# Patient Record
Sex: FEMALE | Race: BLACK OR AFRICAN AMERICAN | Employment: UNEMPLOYED | URBAN - METROPOLITAN AREA
[De-identification: names, ages, dates, MRNs, and addresses within clinical notes are randomized per-mention and may not be internally consistent; named-entity substitution may affect disease eponyms.]

---

## 2017-06-21 ENCOUNTER — DOCUMENTATION ONLY (OUTPATIENT)
Dept: CARDIOLOGY CLINIC | Age: 82
End: 2017-06-21

## 2017-06-21 ENCOUNTER — OFFICE VISIT (OUTPATIENT)
Dept: CARDIOLOGY CLINIC | Age: 82
End: 2017-06-21

## 2017-06-21 VITALS
DIASTOLIC BLOOD PRESSURE: 65 MMHG | WEIGHT: 170 LBS | SYSTOLIC BLOOD PRESSURE: 159 MMHG | HEART RATE: 55 BPM | BODY MASS INDEX: 30.12 KG/M2 | HEIGHT: 63 IN

## 2017-06-21 DIAGNOSIS — I25.119 ATHEROSCLEROSIS OF NATIVE CORONARY ARTERY OF NATIVE HEART WITH ANGINA PECTORIS (HCC): Primary | ICD-10-CM

## 2017-06-21 DIAGNOSIS — I10 ESSENTIAL HYPERTENSION, BENIGN: ICD-10-CM

## 2017-06-21 DIAGNOSIS — R07.9 CHEST PAIN, UNSPECIFIED: ICD-10-CM

## 2017-06-21 DIAGNOSIS — E78.5 OTHER AND UNSPECIFIED HYPERLIPIDEMIA: ICD-10-CM

## 2017-06-21 DIAGNOSIS — Z95.5 S/P CORONARY ARTERY STENT PLACEMENT: ICD-10-CM

## 2017-06-21 DIAGNOSIS — I35.0 NONRHEUMATIC AORTIC VALVE STENOSIS: ICD-10-CM

## 2017-06-21 RX ORDER — GLUCOSAMINE SULFATE 1500 MG
POWDER IN PACKET (EA) ORAL DAILY
COMMUNITY

## 2017-06-21 RX ORDER — NITROGLYCERIN 0.4 MG/1
TABLET SUBLINGUAL
Qty: 25 TAB | Refills: 1 | Status: SHIPPED | OUTPATIENT
Start: 2017-06-21

## 2017-06-21 RX ORDER — METOPROLOL TARTRATE 25 MG/1
25 TABLET, FILM COATED ORAL 2 TIMES DAILY
Qty: 60 TAB | Refills: 6 | Status: SHIPPED | OUTPATIENT
Start: 2017-06-21

## 2017-06-21 NOTE — MR AVS SNAPSHOT
Visit Information Date & Time Provider Department Dept. Phone Encounter #  
 6/21/2017 11:45 AM Ana Maria Donis MD [facility] [address] [place] [address] [id number] Follow-up Instructions Return for F/u after tests. Your Appointments 6/28/2017  9:30 AM  
PROCEDURE with CA NUC [facility] [place] ([address]) Appt Note: porsha sarmiento [address], [address] [place] [address]  
[address], [address] [place] [address] [place] 7/12/2017  8:30 AM  
PROCEDURE with CA ECHO [facility] [place] ([address]) Appt Note: echo sarmiento [address], [address] [place] [address]  
[address], [address] [place] [address] [place] 7/14/2017 10:30 AM  
Office Visit with Ana Maria Donis MD  
[facility]) Appt Note: post nuc and echo [address], [address] [place] [address]  
[address], [address] [place] [address] [place] Upcoming Health Maintenance Date Due HEMOGLOBIN A1C Q6M 1935 FOOT EXAM Q1 1/23/1945 MICROALBUMIN Q1 1/23/1945 EYE EXAM RETINAL OR DILATED Q1 1/23/1946 DTaP/Tdap/Td series (1 - Tdap) 1/23/1957 ZOSTER VACCINE AGE 60> 1/23/1996 GLAUCOMA SCREENING Q2Y 1/23/2001 OSTEOPOROSIS SCREENING (DEXA) 1/23/2001 MEDICARE YEARLY EXAM 1/23/2001 LIPID PANEL Q1 5/11/2011 Pneumococcal 65+ Low/Medium Risk (2 of 2 - PCV13) 12/4/2014 INFLUENZA AGE 9 TO ADULT 8/1/2017 Allergies as of 6/21/2017  Review Complete On: 6/21/2017 By: Ana Maria Donis MD  
 No Known Allergies Current Immunizations  Never Reviewed Name Date Influenza Vaccine PF 12/4/2013 11:20 AM  
 Pneumococcal Polysaccharide (PPSV-23) 12/4/2013 12:10 PM  
  
 Not reviewed this visit You Were Diagnosed With   
  
 Codes Comments Atherosclerosis of native coronary artery of native heart with angina pectoris ([facility] [address]    -  Primary ICD-10-CM: I25.119 ICD-9-CM: 414.01, 413.9 recent increase angina 
ccha class3 Essential hypertension, benign     ICD-10-CM: I10 
ICD-9-CM: 401.1 mildly elevated S/P coronary artery stent placement     ICD-10-CM: Z95.5 ICD-9-CM: K69.78 4299 
rca 
complicated by proximal dissextion Chest pain, unspecified     ICD-10-CM: R07.9 ICD-9-CM: 786.50 anginal  
 Other and unspecified hyperlipidemia     ICD-10-CM: E78.5 ICD-9-CM: 272.4 stable Nonrheumatic aortic valve stenosis     ICD-10-CM: I35.0 ICD-9-CM: 424.1 ? progression Vitals BP Pulse Height(growth percentile) Weight(growth percentile) BMI Smoking Status 159/65 (!) 55 5' 3\" (1.6 m) 170 lb (77.1 kg) 30.11 kg/m2 Current Every Day Smoker Vitals History BMI and BSA Data Body Mass Index Body Surface Area  
 30.11 kg/m 2 1.85 m 2 Preferred Pharmacy Pharmacy Name Phone 919 [address], [address] [place] [address] [phone] Your Updated Medication List  
  
   
This list is accurate as of: 6/21/17 12:30 PM.  Always use your most recent med list. amLODIPine 10 mg tablet Commonly known as:  Linda Salvador Take 10 mg by mouth daily. Indications: HYPERTENSION  
  
 aspirin delayed-release 162 mg tablet Take 1 Tab by mouth daily. diclofenac EC 75 mg EC tablet Commonly known as:  VOLTAREN Take 75 mg by mouth two (2) times a day. hydroCHLOROthiazide 25 mg tablet Commonly known as:  HYDRODIURIL Take 25 mg by mouth daily. isosorbide dinitrate 20 mg tablet Commonly known as:  ISORDIL Take 20 mg by mouth two (2) times a day. KLOR-CON 10 10 mEq tablet Generic drug:  potassium chloride SR Take 10 mEq by mouth daily. metoprolol tartrate 25 mg tablet Commonly known as:  LOPRESSOR Take 1 Tab by mouth two (2) times a day. nitroglycerin 0.4 mg SL tablet Commonly known as:  NITROSTAT PLACE 1 TABLET UNDER THE TONGUE  EVERY FIVE  MINUTES AS NEEDED FOR CHEST PAIN.  
  
 raNITIdine 150 mg tablet Commonly known as:  ZANTAC Take 150 mg by mouth two (2) times a day. simvastatin 40 mg tablet Commonly known as:  ZOCOR Take 40 mg by mouth nightly. VITAMIN D3 1,000 unit Cap Generic drug:  cholecalciferol Take  by mouth daily. Prescriptions Sent to Pharmacy Refills  
 nitroglycerin (NITROSTAT) 0.4 mg SL tablet 1 Sig: PLACE 1 TABLET UNDER THE TONGUE  EVERY FIVE  MINUTES AS NEEDED FOR CHEST PAIN. Class: Normal  
 Pharmacy: [address], [facility] [address] Ph #: [phone]  
 metoprolol tartrate (LOPRESSOR) 25 mg tablet 6 Sig: Take 1 Tab by mouth two (2) times a day. Class: Normal  
 Pharmacy: [facility] [address], [facility] [address] Ph #: [phone] Route: Oral  
  
We Performed the Following AMB POC EKG ROUTINE W/ 12 LEADS, INTER & REP [26925 CPT(R)] Follow-up Instructions Return for F/u after tests. To-Do List   
 06/24/2017 Cardiac Services:  2D ECHO COMPLETE ADULT (TTE)   
  
 06/28/2017 Nursing:  SCHEDULE NUCLEAR STUDY Introducing [facility]! [employer] introduces [employer] patient portal. Now you can access parts of your medical record, email your doctor's office, and request medication refills online. 1. In your internet browser, go to [email or web address] 2. Click on the First Time User? Click Here link in the Sign In box. You will see the New Member Sign Up page. 3. Enter your giddy Access Code exactly as it appears below. You will not need to use this code after youve completed the sign-up process. If you do not sign up before the expiration date, you must request a new code. · giddy Access Code: [email or web address] Expires: 9/19/2017 12:03 PM 
 4. Enter the last four digits of your Social Security Number (xxxx) and Date of Birth (mm/dd/yyyy) as indicated and click Submit. You will be taken to the next sign-up page. 5. Create a [employer] ID. This will be your [employer] login ID and cannot be changed, so think of one that is secure and easy to remember. 6. Create a [employer] password. You can change your password at any time. 7. Enter your Password Reset Question and Answer. This can be used at a later time if you forget your password. 8. Enter your e-mail address. You will receive e-mail notification when new information is available in [address] 9. Click Sign Up. You can now view and download portions of your medical record. 10. Click the Download Summary menu link to download a portable copy of your medical information. If you have questions, please visit the Frequently Asked Questions section of the [employer] website. Remember, [employer] is NOT to be used for urgent needs. For medical emergencies, dial 911. Now available from your iPhone and Android! Please provide this summary of care documentation to your next provider. Your primary care clinician is listed as Barbara De La Rosa. If you have any questions after today's visit, please call [phone].

## 2017-06-21 NOTE — PROGRESS NOTES
HISTORY OF PRESENT ILLNESS  Lowell Gatica is a 81 y.o. female. Valvular Heart Disease   The history is provided by the patient. This is a chronic problem. The problem occurs constantly. The problem has not changed since onset. Associated symptoms include chest pain. Pertinent negatives include no abdominal pain, no headaches and no shortness of breath. Chest Pain (Angina)    The history is provided by the patient. This is a recurrent problem. The current episode started more than 1 week ago. The problem has been gradually worsening. The problem occurs daily. The pain is associated with exertion. The pain is present in the substernal region. The pain is moderate. The quality of the pain is described as pressure-like. The pain does not radiate. Pertinent negatives include no abdominal pain, no claudication, no cough, no dizziness, no fever, no headaches, no hemoptysis, no nausea, no orthopnea, no palpitations, no PND, no shortness of breath, no sputum production, no vomiting and no weakness. Review of Systems   Constitutional: Negative for chills and fever. HENT: Negative for nosebleeds. Eyes: Negative for blurred vision and double vision. Respiratory: Negative for cough, hemoptysis, sputum production, shortness of breath and wheezing. Cardiovascular: Positive for chest pain. Negative for palpitations, orthopnea, claudication, leg swelling and PND. Gastrointestinal: Negative for abdominal pain, heartburn, nausea and vomiting. Musculoskeletal: Negative for myalgias. Skin: Negative for rash. Neurological: Negative for dizziness, weakness and headaches. Endo/Heme/Allergies: Does not bruise/bleed easily.      Family History   Problem Relation Age of Onset    Heart Attack Neg Hx     Heart Surgery Neg Hx        Past Medical History:   Diagnosis Date    Chest pain, unspecified 7/18/2013    possible ngina ,chest wall     Coronary atherosclerosis of native coronary artery 12/18/2013    Essential hypertension     Essential hypertension, benign 7/18/2013    stable     Hypercholesteremia     Hypertension     Other and unspecified angina pectoris 7/24/2013    still with symptoms s/o angina,will maximize meds in view of recent normal nuc scan and see     Other and unspecified angina pectoris 7/24/2013    Other and unspecified hyperlipidemia 7/18/2013    PUD (peptic ulcer disease)     Shortness of breath 7/18/2013    r/o chf,cmp,copd     Type II or unspecified type diabetes mellitus without mention of complication, not stated as uncontrolled 7/18/2013       Past Surgical History:   Procedure Laterality Date    HX CATARACT REMOVAL         Social History   Substance Use Topics    Smoking status: Current Every Day Smoker     Packs/day: 0.50    Smokeless tobacco: Never Used    Alcohol use 2.0 oz/week     4 Cans of beer per week       No Known Allergies    Prior to Admission medications    Medication Sig Start Date End Date Taking? Authorizing Provider   cholecalciferol (VITAMIN D3) 1,000 unit cap Take  by mouth daily. Yes Historical Provider   nitroglycerin (NITROSTAT) 0.4 mg SL tablet PLACE 1 TABLET UNDER THE TONGUE  EVERY FIVE  MINUTES AS NEEDED FOR CHEST PAIN. 6/21/17  Yes Kristan Miguel MD   metoprolol tartrate (LOPRESSOR) 25 mg tablet Take 1 Tab by mouth two (2) times a day. 6/21/17  Yes Kristan Miguel MD   hydrochlorothiazide (HYDRODIURIL) 25 mg tablet Take 25 mg by mouth daily. Yes Kristian Medeiros MD   ranitidine (ZANTAC) 150 mg tablet Take 150 mg by mouth two (2) times a day. Yes Kristian Medeiros MD   amLODIPine (NORVASC) 10 mg tablet Take 10 mg by mouth daily. Indications: HYPERTENSION   Yes Kristian Medeiros MD   diclofenac EC (VOLTAREN) 75 mg EC tablet Take 75 mg by mouth two (2) times a day. Yes Kristian Medeiros MD   simvastatin (ZOCOR) 40 mg tablet Take 40 mg by mouth nightly. Yes Kristian Medeiros MD   isosorbide dinitrate (ISORDIL) 20 mg tablet Take 20 mg by mouth two (2) times a day.    Yes Kristian Medeiros MD   aspirin delayed-release 162 mg tablet Take 1 Tab by mouth daily. 2/13/15   Ilda Carpenter MD   potassium chloride SR (KLOR-CON 10) 10 mEq tablet Take 10 mEq by mouth daily. Phys Other, MD         Visit Vitals    /65    Pulse (!) 55    Ht 5' 3\" (1.6 m)    Wt 77.1 kg (170 lb)    BMI 30.11 kg/m2         Physical Exam   Constitutional: She is oriented to person, place, and time. She appears well-developed and well-nourished. HENT:   Head: Normocephalic and atraumatic. Eyes: Conjunctivae are normal.   Neck: Neck supple. No JVD present. No tracheal deviation present. No thyromegaly present. Cardiovascular: Normal rate and regular rhythm. PMI is not displaced. Exam reveals no gallop, no S3 and no decreased pulses. Murmur heard. Harsh midsystolic murmur is present with a grade of 3/6  at the upper right sternal border radiating to the neck  Pulmonary/Chest: No respiratory distress. She has no wheezes. She has no rales. She exhibits no tenderness. Abdominal: Soft. There is no tenderness. Musculoskeletal: She exhibits no edema. Neurological: She is alert and oriented to person, place, and time. Skin: Skin is warm. Psychiatric: She has a normal mood and affect. Ms. Prudence Harada has a reminder for a \"due or due soon\" health maintenance. I have asked that she contact her primary care provider for follow-up on this health maintenance. CARDIOLOGY STUDIES 7/19/2013   Myocardial Perfusion Scan Result normal   Echocardiogram - Complete Result normal ef,dd,enlarged la   Some recent data might be hidden   2013  PROCEDURES IN HOSPITAL:12/2013: Cardiac catheterization followed by angioplasty and   stent in right posterior descending artery with a drug-eluting stent. This   was complicated by catheter induced dissection at ostium and proximal right   coronary artery which also had dye staining in aortic root around the right   coronary area.  She had stenting of proximal right coronary artery and dye   staining had improved in aortic root. 12/2013  CORONARY INTERVENTION REPORT: Right coronary intervention was performed   with PDA reduced from 90% to 0% with NAN grade 3 flow. Ostial and proximal   dissection was stacked with the use of a 2.7 x 12 mm stent and was reduced   to 0%, NAN grade 3 flow is noted in right coronary artery  SUMMARY:echo-2013  Left ventricle: Systolic function was normal.    Right ventricle: The ventricle was dilated. Left atrium: The atrium was markedly dilated. Mitral valve: There was mild regurgitation. Aortic valve: There was mild stenosis. Valve mean gradient was 17.76 mmHg. Aorta, systemic arteries: There was no evidence for dissection. There were  calcific changes of the aortic root and ascending aorta. Other visualized  portions of the aorta appeared normal.  6/2017-ekg  Sinus  Bradycardia   Low voltage in precordial leads. -RSR(V1) -nondiagnostic. ABNORMAL    Assessment         ICD-10-CM ICD-9-CM    1. Atherosclerosis of native coronary artery of native heart with angina pectoris (HCC) I25.119 414.01 AMB POC EKG ROUTINE W/ 12 LEADS, INTER & REP     413.9 2D ECHO COMPLETE ADULT (TTE)      SCHEDULE NUCLEAR STUDY    recent increase angina  ccha class3   2. Essential hypertension, benign I10 401.1     mildly elevated   3. S/P coronary artery stent placement Z95.5 V45.82     9797  rca  complicated by proximal dissextion   4. Chest pain, unspecified R07.9 786.50     anginal   5. Other and unspecified hyperlipidemia E78.5 272.4     stable   6. Nonrheumatic aortic valve stenosis I35.0 424.1 2D ECHO COMPLETE ADULT (TTE)      SCHEDULE NUCLEAR STUDY    ?  progression       Medications Discontinued During This Encounter   Medication Reason    nitroglycerin (NITROSTAT) 0.4 mg SL tablet Reorder    metoprolol (LOPRESSOR) 50 mg tablet Reorder       Orders Placed This Encounter    SCHEDULE NUCLEAR STUDY     lexiscan stress test     Standing Status:   Future Standing Expiration Date:   6/21/2018    2D ECHO COMPLETE ADULT (TTE)     Standing Status:   Future     Standing Expiration Date:   12/18/2017     Order Specific Question:   Reason for Exam:     Answer:   see diagnosis    AMB POC EKG ROUTINE W/ 12 LEADS, INTER & REP     Order Specific Question:   Reason for Exam:     Answer:   CAD    nitroglycerin (NITROSTAT) 0.4 mg SL tablet     Sig: PLACE 1 TABLET UNDER THE TONGUE  EVERY FIVE  MINUTES AS NEEDED FOR CHEST PAIN. Dispense:  25 Tab     Refill:  1    metoprolol tartrate (LOPRESSOR) 25 mg tablet     Sig: Take 1 Tab by mouth two (2) times a day. Dispense:  60 Tab     Refill:  6       Follow-up Disposition:  Return for F/u after tests.

## 2017-06-21 NOTE — LETTER
Carlota Coker 1935 6/21/2017 Dear Ronn Lewis MD 
 
I had the pleasure of evaluating  Ms. Silvino Coleman in office today. Below are the relevant portions of my assessment and plan of care. ICD-10-CM ICD-9-CM 1. 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  
  413.9 2D ECHO COMPLETE ADULT (TTE) SCHEDULE NUCLEAR STUDY  
 recent increase angina 
ccha class3 2. Essential hypertension, benign I10 401.1   
 mildly elevated 3. S/P coronary artery stent placement Z95.5 V45.82   
 3300 
rca 
complicated by proximal dissextion 4. Chest pain, unspecified R07.9 786.50   
 anginal  
5. Other and unspecified hyperlipidemia E78.5 272.4   
 stable 6. Nonrheumatic aortic valve stenosis I35.0 424.1 2D ECHO COMPLETE ADULT (TTE) SCHEDULE NUCLEAR STUDY ? progression Current Outpatient Prescriptions Medication Sig Dispense Refill  cholecalciferol (VITAMIN D3) 1,000 unit cap Take  by mouth daily.  nitroglycerin (NITROSTAT) 0.4 mg SL tablet PLACE 1 TABLET UNDER THE TONGUE  EVERY FIVE  MINUTES AS NEEDED FOR CHEST PAIN. 25 Tab 1  
 metoprolol tartrate (LOPRESSOR) 25 mg tablet Take 1 Tab by mouth two (2) times a day. 60 Tab 6  
 hydrochlorothiazide (HYDRODIURIL) 25 mg tablet Take 25 mg by mouth daily.  ranitidine (ZANTAC) 150 mg tablet Take 150 mg by mouth two (2) times a day.  amLODIPine (NORVASC) 10 mg tablet Take 10 mg by mouth daily. Indications: HYPERTENSION    
 diclofenac EC (VOLTAREN) 75 mg EC tablet Take 75 mg by mouth two (2) times a day.  simvastatin (ZOCOR) 40 mg tablet Take 40 mg by mouth nightly.  isosorbide dinitrate (ISORDIL) 20 mg tablet Take 20 mg by mouth two (2) times a day.  aspirin delayed-release 162 mg tablet Take 1 Tab by mouth daily. 100 Tab 6  potassium chloride SR (KLOR-CON 10) 10 mEq tablet Take 10 mEq by mouth daily. Orders Placed This Encounter  SCHEDULE NUCLEAR STUDY  
  lexiscan stress test  
  Standing Status:   Future Standing Expiration Date:   6/21/2018  2D ECHO COMPLETE ADULT (TTE) Standing Status:   Future Standing Expiration Date:   12/18/2017 Order Specific Question:   Reason for Exam: Answer:   see diagnosis  AMB POC EKG ROUTINE W/ 12 LEADS, INTER & REP Order Specific Question:   Reason for Exam: Answer:   CAD  cholecalciferol (VITAMIN D3) 1,000 unit cap Sig: Take  by mouth daily.  nitroglycerin (NITROSTAT) 0.4 mg SL tablet Sig: PLACE 1 TABLET UNDER THE TONGUE  EVERY FIVE  MINUTES AS NEEDED FOR CHEST PAIN. Dispense:  25 Tab Refill:  1  
 metoprolol tartrate (LOPRESSOR) 25 mg tablet Sig: Take 1 Tab by mouth two (2) times a day. Dispense:  60 Tab Refill:  6 If you have questions, please do not hesitate to call me. I look forward to following Ms. Meghan Arzate along with you. Sincerely, Car Nolen MD

## 2017-06-28 ENCOUNTER — CLINICAL SUPPORT (OUTPATIENT)
Dept: CARDIOLOGY CLINIC | Age: 82
End: 2017-06-28

## 2017-06-28 DIAGNOSIS — R07.9 CHEST PAIN, UNSPECIFIED: Primary | ICD-10-CM

## 2017-06-28 DIAGNOSIS — Z95.5 S/P CORONARY ARTERY STENT PLACEMENT: ICD-10-CM

## 2017-06-28 DIAGNOSIS — I10 ESSENTIAL HYPERTENSION, BENIGN: ICD-10-CM

## 2017-06-28 DIAGNOSIS — I25.119 ATHEROSCLEROSIS OF NATIVE CORONARY ARTERY OF NATIVE HEART WITH ANGINA PECTORIS (HCC): ICD-10-CM

## 2017-06-28 NOTE — MR AVS SNAPSHOT
Visit Information Date & Time Provider Department Dept. Phone Encounter #  
 6/28/2017  9:30 AM [facility] [address] [place] [address] [id number] Your Appointments 7/12/2017  8:30 AM  
PROCEDURE with CA ECHO [facility] [place] (Margarita Uziel) Appt Note: echo sarmiento [address], [address] [address]  
[address], [address] [facility] [address] 7/14/2017 10:30 AM  
Office Visit with Peace Mathis MD  
[facility]) Appt Note: post nuc and echo [address], [address] [address]  
[address], [address] [facility] [address] Upcoming Health Maintenance Date Due HEMOGLOBIN A1C Q6M 1935 FOOT EXAM Q1 1/23/1945 MICROALBUMIN Q1 1/23/1945 EYE EXAM RETINAL OR DILATED Q1 1/23/1946 DTaP/Tdap/Td series (1 - Tdap) 1/23/1957 ZOSTER VACCINE AGE 60> 1/23/1996 GLAUCOMA SCREENING Q2Y 1/23/2001 OSTEOPOROSIS SCREENING (DEXA) 1/23/2001 MEDICARE YEARLY EXAM 1/23/2001 LIPID PANEL Q1 5/11/2011 Pneumococcal 65+ Low/Medium Risk (2 of 2 - PCV13) 12/4/2014 INFLUENZA AGE 9 TO ADULT 8/1/2017 Allergies as of 6/28/2017  Review Complete On: 6/28/2017 By: Pablito Hoffman LPN No Known Allergies Current Immunizations  Never Reviewed Name Date Influenza Vaccine PF 12/4/2013 11:20 AM  
 Pneumococcal Polysaccharide (PPSV-23) 12/4/2013 12:10 PM  
  
 Not reviewed this visit You Were Diagnosed With   
  
 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 
ICD-9-CM: 401.1 S/P coronary artery stent placement     ICD-10-CM: Z95.5 ICD-9-CM: V45.82 Vitals Smoking Status Current Every Day Smoker Preferred Pharmacy Pharmacy Name Phone 919 [address], [address] [place] [address] ROAD [phone] Your Updated Medication List  
  
   
This list is accurate as of: 6/28/17 12:25 PM.  Always use your most recent med list. amLODIPine 10 mg tablet Commonly known as:  Karyle Grant Take 10 mg by mouth daily. Indications: HYPERTENSION  
  
 aspirin delayed-release 162 mg tablet Take 1 Tab by mouth daily. diclofenac EC 75 mg EC tablet Commonly known as:  VOLTAREN Take 75 mg by mouth two (2) times a day. hydroCHLOROthiazide 25 mg tablet Commonly known as:  HYDRODIURIL Take 25 mg by mouth daily. isosorbide dinitrate 20 mg tablet Commonly known as:  ISORDIL Take 20 mg by mouth two (2) times a day. KLOR-CON 10 10 mEq tablet Generic drug:  potassium chloride SR Take 10 mEq by mouth daily. metoprolol tartrate 25 mg tablet Commonly known as:  LOPRESSOR Take 1 Tab by mouth two (2) times a day. nitroglycerin 0.4 mg SL tablet Commonly known as:  NITROSTAT PLACE 1 TABLET UNDER THE TONGUE  EVERY FIVE  MINUTES AS NEEDED FOR CHEST PAIN.  
  
 raNITIdine 150 mg tablet Commonly known as:  ZANTAC Take 150 mg by mouth two (2) times a day. regadenoson 0.4 mg/5 mL Syrg injection Commonly known as:  LEXISCAN  
5 mL by IntraVENous route once for 1 dose. simvastatin 40 mg tablet Commonly known as:  ZOCOR Take 40 mg by mouth nightly. VITAMIN D3 1,000 unit Cap Generic drug:  cholecalciferol Take  by mouth daily. We Performed the Following CARDIAC STRESS TST,COMPLETE [94188 CPT(R)] CHG MYOCARDIAL SPECT MULTIPLE STUDIES [04852 CPT(R)] MS TC99M TETROFOSMIN [ [facility]] MS TC99M TETROFOSMIN [ [facility]] REGADENOSON 0.1 MG INJECTION [ [facility]] [facility]! [facility] introduces [employer] patient portal. Now you can access parts of your medical record, email your doctor's office, and request medication refills online. 1. In your internet browser, go to [email or web address]. [email or web address] 2. Click on the First Time User? Click Here link in the Sign In box. You will see the New Member Sign Up page. 3. Enter your [employer] Access Code exactly as it appears below. You will not need to use this code after youve completed the sign-up process. If you do not sign up before the expiration date, you must request a new code. · [employer] Access Code: [email or web address] Expires: 9/19/2017 12:03 PM 
 
4. Enter the last four digits of your Social Security Number (xxxx) and Date of Birth (mm/dd/yyyy) as indicated and click Submit. You will be taken to the next sign-up page. 5. Create a [employer] ID. This will be your [employer] login ID and cannot be changed, so think of one that is secure and easy to remember. 6. Create a [employer] password. You can change your password at any time. 7. Enter your Password Reset Question and Answer. This can be used at a later time if you forget your password. 8. Enter your e-mail address. You will receive e-mail notification when new information is available in [address] 9. Click Sign Up. You can now view and download portions of your medical record. 10. Click the Download Summary menu link to download a portable copy of your medical information. If you have questions, please visit the Frequently Asked Questions section of the [employer] website. Remember, [employer] is NOT to be used for urgent needs. For medical emergencies, dial 911. Now available from your iPhone and Android! Please provide this summary of care documentation to your next provider. Your primary care clinician is listed as Renetta Romero. If you have any questions after today's visit, please call [phone].

## 2017-06-28 NOTE — PROGRESS NOTES
[facility]  [address], [facility], [address] [place] [address], [place], [address] [place] [address]  [phone] [facility]  [phone] [place]       Name: Lester Perea         MRN#: [id number]        YOB: 1935   Gender: female Ht:5'3\" Wt:170 lbs       . Date of Rest/Stress Images: 6/28/2017   Referring Physician: Durga Ballesteros MD  Ordering Physician: Daniel Yung. Sadaf Love MD, [facility]  Technologist: Omayra Rodriguez. NOEL Ennis., C.N.M.T  Diagnosis:  1. Chest pain, unspecified    2. Atherosclerosis of native coronary artery of native heart with angina pectoris ([address]    3. Essential hypertension, benign    4. S/P coronary artery stent placement          Rest/Stress Myoview SPECT Myocardial Perfusion Imaging with  Lexiscan Stress and gated SPECT Imaging      PROCEDURE:      Myocardial perfusion imaging was performed at rest approximately 30 mins following the intravenous injection,(Right hand ) of 11.6 mCi of Tc99m Myoview for evaluation of myocardial function and perfusion at rest.    Baseline Data:    Baseline EKG reveals sinus bradycardia, nonspecific ST-T changes. Baseline heart rate is 45. Baseline blood pressure is 134/68. Procedure: The patient was injected with 0.4 mg IV Lexiscan. The patient had no significant symptoms. Heart rate increased from baseline to a heart rate of 79. Blood pressure increased to 152/64. Electrocardiogram showed no significant ST-T changes during the procedure. No significant arrhythmias noted. Diagnosis:   1. Negative EKG portion of Lexiscan stress test.   2. Nuclear imaging report to follow. Pharmacological:  Patient was injected with . 4 mg/mL with Lexiscan intravenously over a period 10 to 20 sec. After pharmacologic stress, the patient was injected intravenously with 38.2 mCi of Tc99m Myoview. Gating post stress tomographic imaging performed approximately 45 minutes post tracer injection.  The data was reconstructed in the short, horizontal long and vertical long axis views and tomographic slices were generated. NUCLEAR IMAGING:    Findings:   1. Stress images reveal normal Myoview distrubution in all the LV segments in short axis, vertical and horizontal long axis views. 2. Resting images have a normal uptake. 3. Gated images reveal normal wall motion and the ejection fraction is calculated to be 85%. Conclusion:   1. Normal perfusion scan. 2. Normal wall motion and ejection fraction. 3. Low risk scan. Thank you for the referral.    E-signed and Interpreting Physician:    Alena Bahena.  Marcelina Lizarraga MD, [facility]     Date of interpretation: 6/28/2017  Date of final report: 6/28/2017

## 2017-07-27 ENCOUNTER — OFFICE VISIT (OUTPATIENT)
Dept: CARDIOLOGY CLINIC | Age: 82
End: 2017-07-27

## 2017-07-27 VITALS
WEIGHT: 170 LBS | HEIGHT: 63 IN | DIASTOLIC BLOOD PRESSURE: 78 MMHG | HEART RATE: 55 BPM | BODY MASS INDEX: 30.12 KG/M2 | SYSTOLIC BLOOD PRESSURE: 185 MMHG

## 2017-07-27 DIAGNOSIS — Z95.5 S/P CORONARY ARTERY STENT PLACEMENT: ICD-10-CM

## 2017-07-27 DIAGNOSIS — I25.119 ATHEROSCLEROSIS OF NATIVE CORONARY ARTERY OF NATIVE HEART WITH ANGINA PECTORIS (HCC): Primary | ICD-10-CM

## 2017-07-27 DIAGNOSIS — I35.0 NONRHEUMATIC AORTIC VALVE STENOSIS: ICD-10-CM

## 2017-07-27 DIAGNOSIS — I10 ESSENTIAL HYPERTENSION, BENIGN: ICD-10-CM

## 2017-07-27 NOTE — MR AVS SNAPSHOT
Visit Information Date & Time Provider Department Dept. Phone Encounter #  
 7/27/2017 11:00 AM Bj Ibarra MD [facility] [address] [facility] [id number] Follow-up Instructions Return in about 3 months (around 10/27/2017). Your Appointments 8/18/2017 10:45 AM  
PROCEDURE with [place] E[facility] [place] ([facility]) Appt Note: echo sarmiento ruby per [facility]; patient no show; echo sarmiento ruby per [facility] pt r/s; mailbox is full 7/20/17 hlr; PAT CALLED TO CANCEL, WILL CALL BACK  [address]; echo sarmiento ruby per [facility] [email or web address], [address] [facility] [address]  
[address], [address] [address]  
  
    
 10/19/2017 10:30 AM  
Office Visit with Bj Ibarra MD  
[facility]) Appt Note: 3 m post echo [email or web address], [address] [facility] [address]  
[address], [address] Upcoming Health Maintenance Date Due HEMOGLOBIN A1C Q6M 1935 FOOT EXAM Q1 1/23/1946 MICROALBUMIN Q1 1/23/1946 EYE EXAM RETINAL OR DILATED Q1 1/23/1946 DTaP/Tdap/Td series (1 - Tdap) 1/23/1957 ZOSTER VACCINE AGE 60> 11/23/1995 GLAUCOMA SCREENING Q2Y 1/23/2001 OSTEOPOROSIS SCREENING (DEXA) 1/23/2001 MEDICARE YEARLY EXAM 1/23/2001 LIPID PANEL Q1 5/11/2011 Pneumococcal 65+ Low/Medium Risk (2 of 2 - PCV13) 12/4/2014 INFLUENZA AGE 9 TO ADULT 8/1/2017 Allergies as of 7/27/2017  Review Complete On: 7/27/2017 By: Bj Ibarra MD  
 No Known Allergies Current Immunizations  Never Reviewed Name Date Influenza Vaccine PF 12/4/2013 11:20 AM  
 Pneumococcal Polysaccharide (PPSV-23) 12/4/2013 12:10 PM  
  
 Not reviewed this visit You Were Diagnosed With   
  
 Codes Comments  Atherosclerosis of native coronary artery of native heart with angina pectoris ([facility] [address]    -  Primary ICD-10-CM: I25.119 ICD-9-CM: 414.01, 413.9 stable 
negative stress test 
manage medically Nonrheumatic aortic valve stenosis     ICD-10-CM: I35.0 ICD-9-CM: 424.1 check echo Essential hypertension, benign     ICD-10-CM: I10 
ICD-9-CM: 401.1 elevated 
labile 
monitor S/P coronary artery stent placement     ICD-10-CM: Z95.5 ICD-9-CM: V45.82 Vitals BP Pulse Height(growth percentile) Weight(growth percentile) BMI Smoking Status 185/78 (!) 55 5' 3\" (1.6 m) 170 lb (77.1 kg) 30.11 kg/m2 Current Every Day Smoker Vitals History BMI and BSA Data Body Mass Index Body Surface Area  
 30.11 kg/m 2 1.85 m 2 Preferred Pharmacy Pharmacy Name Phone 9[address], [address] [place] [address] [phone] Your Updated Medication List  
  
   
This list is accurate as of: 7/27/17 11:44 AM.  Always use your most recent med list. amLODIPine 10 mg tablet Commonly known as:  Kimberli Matar Take 10 mg by mouth daily. Indications: HYPERTENSION  
  
 aspirin delayed-release 162 mg tablet Take 1 Tab by mouth daily. diclofenac EC 75 mg EC tablet Commonly known as:  VOLTAREN Take 75 mg by mouth two (2) times a day. hydroCHLOROthiazide 25 mg tablet Commonly known as:  HYDRODIURIL Take 25 mg by mouth daily. isosorbide dinitrate 20 mg tablet Commonly known as:  ISORDIL Take 20 mg by mouth two (2) times a day. metoprolol tartrate 25 mg tablet Commonly known as:  LOPRESSOR Take 1 Tab by mouth two (2) times a day. nitroglycerin 0.4 mg SL tablet Commonly known as:  NITROSTAT PLACE 1 TABLET UNDER THE TONGUE  EVERY FIVE  MINUTES AS NEEDED FOR CHEST PAIN.  
  
 raNITIdine 150 mg tablet Commonly known as:  ZANTAC Take 150 mg by mouth two (2) times a day. simvastatin 40 mg tablet Commonly known as:  ZOCOR Take 40 mg by mouth nightly. VITAMIN D3 1,000 unit Cap Generic drug:  cholecalciferol Take  by mouth daily. Follow-up Instructions Return in about 3 months (around 10/27/2017). To-Do List   
 07/30/2017 Cardiac Services:  2D ECHO COMPLETE ADULT (TTE) Introducing [facility]! [employer] introduces [employer] patient portal. Now you can access parts of your medical record, email your doctor's office, and request medication refills online. 1. In your internet browser, go to [email or web address]://[email or web address]. [employer] 2. Click on the First Time User? Click Here link in the Sign In box. You will see the New Member Sign Up page. 3. Enter your [employer] Access Code exactly as it appears below. You will not need to use this code after youve completed the sign-up process. If you do not sign up before the expiration date, you must request a new code. · [employer] Access Code: [email or web address] Expires: 9/19/2017 12:03 PM 
 
4. Enter the last four digits of your Social Security Number (xxxx) and Date of Birth (mm/dd/yyyy) as indicated and click Submit. You will be taken to the next sign-up page. 5. Create a [employer] ID. This will be your [employer] login ID and cannot be changed, so think of one that is secure and easy to remember. 6. Create a [employer] password. You can change your password at any time. 7. Enter your Password Reset Question and Answer. This can be used at a later time if you forget your password. 8. Enter your e-mail address. You will receive e-mail notification when new information is available in [address] 9. Click Sign Up. You can now view and download portions of your medical record. 10. Click the Download Summary menu link to download a portable copy of your medical information. If you have questions, please visit the Frequently Asked Questions section of the [employer] website. Remember, [employer] is NOT to be used for urgent needs. For medical emergencies, dial 911. Now available from your iPhone and Android! Please provide this summary of care documentation to your next provider. Your primary care clinician is listed as Manuel Barnes. If you have any questions after today's visit, please call [phone].

## 2017-07-27 NOTE — LETTER
Trevon Annalisadonnie 1935 7/27/2017 Dear Audra Aguilar MD 
 
I had the pleasure of evaluating  Ms. Balwinder Alonzo in office today. Below are the relevant portions of my assessment and plan of care. ICD-10-CM ICD-9-CM 1. Atherosclerosis of native coronary artery of native heart with angina pectoris (HCC) I25.119 414.01   
  413.9   
 stable 
negative stress test 
manage medically 2. Nonrheumatic aortic valve stenosis I35.0 424.1 2D ECHO COMPLETE ADULT (TTE) check echo 3. Essential hypertension, benign I10 401.1   
 elevated 
labile 
monitor 4. S/P coronary artery stent placement Z95.5 V45.82 Current Outpatient Prescriptions Medication Sig Dispense Refill  cholecalciferol (VITAMIN D3) 1,000 unit cap Take  by mouth daily.  nitroglycerin (NITROSTAT) 0.4 mg SL tablet PLACE 1 TABLET UNDER THE TONGUE  EVERY FIVE  MINUTES AS NEEDED FOR CHEST PAIN. 25 Tab 1  
 metoprolol tartrate (LOPRESSOR) 25 mg tablet Take 1 Tab by mouth two (2) times a day. 60 Tab 6  
 aspirin delayed-release 162 mg tablet Take 1 Tab by mouth daily. 100 Tab 6  
 hydrochlorothiazide (HYDRODIURIL) 25 mg tablet Take 25 mg by mouth daily.  ranitidine (ZANTAC) 150 mg tablet Take 150 mg by mouth two (2) times a day.  amLODIPine (NORVASC) 10 mg tablet Take 10 mg by mouth daily. Indications: HYPERTENSION    
 diclofenac EC (VOLTAREN) 75 mg EC tablet Take 75 mg by mouth two (2) times a day.  simvastatin (ZOCOR) 40 mg tablet Take 40 mg by mouth nightly.  isosorbide dinitrate (ISORDIL) 20 mg tablet Take 20 mg by mouth two (2) times a day. Orders Placed This Encounter  2D ECHO COMPLETE ADULT (TTE) Standing Status:   Future Standing Expiration Date:   1/23/2018 Order Specific Question:   Reason for Exam: Answer:   see diagnosis If you have questions, please do not hesitate to call me. I look forward to following Ms. Balwinder Alonzo along with you. Sincerely, Brian Mosquera MD

## 2017-07-27 NOTE — PROGRESS NOTES
HISTORY OF PRESENT ILLNESS  Jeni Tejeda is a 81 y.o. female. HPI Comments: Patient is here for follow up of diagnostic tests. Results will be discussed. Valvular Heart Disease   The history is provided by the patient. This is a chronic problem. The problem occurs constantly. The problem has not changed since onset. Associated symptoms include chest pain. Pertinent negatives include no abdominal pain, no headaches and no shortness of breath. Chest Pain (Angina)    The history is provided by the patient. This is a recurrent problem. The current episode started more than 1 week ago. The problem has been gradually worsening. The problem occurs daily. The pain is associated with exertion. The pain is present in the substernal region. The pain is moderate. The quality of the pain is described as pressure-like. The pain does not radiate. Pertinent negatives include no abdominal pain, no claudication, no cough, no dizziness, no fever, no headaches, no hemoptysis, no nausea, no orthopnea, no palpitations, no PND, no shortness of breath, no sputum production, no vomiting and no weakness. Review of Systems   Constitutional: Negative for chills and fever. HENT: Negative for nosebleeds. Eyes: Negative for blurred vision and double vision. Respiratory: Negative for cough, hemoptysis, sputum production, shortness of breath and wheezing. Cardiovascular: Positive for chest pain. Negative for palpitations, orthopnea, claudication, leg swelling and PND. Gastrointestinal: Negative for abdominal pain, heartburn, nausea and vomiting. Musculoskeletal: Negative for myalgias. Skin: Negative for rash. Neurological: Negative for dizziness, weakness and headaches. Endo/Heme/Allergies: Does not bruise/bleed easily.      Family History   Problem Relation Age of Onset    Heart Attack Neg Hx     Heart Surgery Neg Hx        Past Medical History:   Diagnosis Date    Chest pain, unspecified 7/18/2013    possible adryan ,chest wall     Coronary atherosclerosis of native coronary artery 12/18/2013    Essential hypertension     Essential hypertension, benign 7/18/2013    stable     Hypercholesteremia     Hypertension     Other and unspecified angina pectoris 7/24/2013    still with symptoms s/o angina,will maximize meds in view of recent normal nuc scan and see     Other and unspecified angina pectoris 7/24/2013    Other and unspecified hyperlipidemia 7/18/2013    PUD (peptic ulcer disease)     Shortness of breath 7/18/2013    r/o chf,cmp,copd     Type II or unspecified type diabetes mellitus without mention of complication, not stated as uncontrolled 7/18/2013       Past Surgical History:   Procedure Laterality Date    HX CATARACT REMOVAL         Social History   Substance Use Topics    Smoking status: Current Every Day Smoker     Packs/day: 0.50    Smokeless tobacco: Never Used    Alcohol use 2.0 oz/week     4 Cans of beer per week       No Known Allergies    Prior to Admission medications    Medication Sig Start Date End Date Taking? Authorizing Provider   cholecalciferol (VITAMIN D3) 1,000 unit cap Take  by mouth daily. Yes Historical Provider   nitroglycerin (NITROSTAT) 0.4 mg SL tablet PLACE 1 TABLET UNDER THE TONGUE  EVERY FIVE  MINUTES AS NEEDED FOR CHEST PAIN. 6/21/17  Yes Micki Ortiz MD   metoprolol tartrate (LOPRESSOR) 25 mg tablet Take 1 Tab by mouth two (2) times a day. 6/21/17  Yes Micki Ortiz MD   aspirin delayed-release 162 mg tablet Take 1 Tab by mouth daily. 2/13/15  Yes Micki Ortiz MD   hydrochlorothiazide (HYDRODIURIL) 25 mg tablet Take 25 mg by mouth daily. Yes Kristian Medeiros MD   ranitidine (ZANTAC) 150 mg tablet Take 150 mg by mouth two (2) times a day. Yes Kristain Medeiros MD   amLODIPine (NORVASC) 10 mg tablet Take 10 mg by mouth daily. Indications: HYPERTENSION   Yes Kristian Medeiros MD   diclofenac EC (VOLTAREN) 75 mg EC tablet Take 75 mg by mouth two (2) times a day.    Yes Phys Other, MD   simvastatin (ZOCOR) 40 mg tablet Take 40 mg by mouth nightly. Yes Phys Other, MD   isosorbide dinitrate (ISORDIL) 20 mg tablet Take 20 mg by mouth two (2) times a day. Yes Phys MD Waldemar         Visit Vitals    /78    Pulse (!) 55    Ht 5' 3\" (1.6 m)    Wt 77.1 kg (170 lb)    BMI 30.11 kg/m2         Physical Exam   Constitutional: She is oriented to person, place, and time. She appears well-developed and well-nourished. HENT:   Head: Normocephalic and atraumatic. Eyes: Conjunctivae are normal.   Neck: Neck supple. No JVD present. No tracheal deviation present. No thyromegaly present. Cardiovascular: Normal rate and regular rhythm. PMI is not displaced. Exam reveals no gallop, no S3 and no decreased pulses. Murmur heard. Harsh midsystolic murmur is present with a grade of 3/6  at the upper right sternal border radiating to the neck  Pulmonary/Chest: No respiratory distress. She has no wheezes. She has no rales. She exhibits no tenderness. Abdominal: Soft. There is no tenderness. Musculoskeletal: She exhibits no edema. Neurological: She is alert and oriented to person, place, and time. Skin: Skin is warm. Psychiatric: She has a normal mood and affect. Ms. Balwinder Alonzo has a reminder for a \"due or due soon\" health maintenance. I have asked that she contact her primary care provider for follow-up on this health maintenance. CARDIOLOGY STUDIES 7/19/2013   Myocardial Perfusion Scan Result normal   Echocardiogram - Complete Result normal ef,dd,enlarged la   Some recent data might be hidden   2013  PROCEDURES IN HOSPITAL:12/2013: Cardiac catheterization followed by angioplasty and   stent in right posterior descending artery with a drug-eluting stent. This   was complicated by catheter induced dissection at ostium and proximal right   coronary artery which also had dye staining in aortic root around the right   coronary area.  She had stenting of proximal right coronary artery and dye   staining had improved in aortic root. 2013  CORONARY INTERVENTION REPORT: Right coronary intervention was performed   with PDA reduced from 90% to 0% with NAN grade 3 flow. Ostial and proximal   dissection was stacked with the use of a 2.7 x 12 mm stent and was reduced   to 0%, NAN grade 3 flow is noted in right coronary artery  SUMMARY:echo-2013  Left ventricle: Systolic function was normal.    Right ventricle: The ventricle was dilated. Left atrium: The atrium was markedly dilated. Mitral valve: There was mild regurgitation. Aortic valve: There was mild stenosis. Valve mean gradient was 17.76 mmHg. Aorta, systemic arteries: There was no evidence for dissection. There were  calcific changes of the aortic root and ascending aorta. Other visualized  portions of the aorta appeared normal.  2017-ekg  Sinus  Bradycardia   Low voltage in precordial leads. -RSR(V1) -nondiagnostic. ABNORMAL  NUCLEAR IMAGIN2017   Findings:   1. Stress images reveal normal Myoview distrubution in all the LV segments in short axis, vertical and horizontal long axis views. 2. Resting images have a normal uptake. 3. Gated images reveal normal wall motion and the ejection fraction is calculated to be 85%. Conclusion:   1. Normal perfusion scan. 2. Normal wall motion and ejection fraction. 3. Low risk scan. Assessment         ICD-10-CM ICD-9-CM    1. Atherosclerosis of native coronary artery of native heart with angina pectoris (HCC) I25.119 414.01      413.9     stable  negative stress test  manage medically   2. Nonrheumatic aortic valve stenosis I35.0 424.1 2D ECHO COMPLETE ADULT (TTE)    check echo   3. Essential hypertension, benign I10 401.1     elevated  labile  monitor   4.  S/P coronary artery stent placement Z95.5 V45.82        Medications Discontinued During This Encounter   Medication Reason    potassium chloride SR (KLOR-CON 10) 10 mEq tablet Not A Current Medication Orders Placed This Encounter    2D ECHO COMPLETE ADULT (TTE)     Standing Status:   Future     Standing Expiration Date:   1/23/2018     Order Specific Question:   Reason for Exam:     Answer:   see diagnosis       Follow-up Disposition:  Return in about 3 months (around 10/27/2017).

## 2017-08-18 ENCOUNTER — CLINICAL SUPPORT (OUTPATIENT)
Dept: CARDIOLOGY CLINIC | Age: 82
End: 2017-08-18

## 2017-08-18 DIAGNOSIS — I35.0 NONRHEUMATIC AORTIC VALVE STENOSIS: ICD-10-CM
